# Patient Record
Sex: MALE | Race: WHITE | Employment: STUDENT | URBAN - METROPOLITAN AREA
[De-identification: names, ages, dates, MRNs, and addresses within clinical notes are randomized per-mention and may not be internally consistent; named-entity substitution may affect disease eponyms.]

---
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
## 2024-01-17 ENCOUNTER — TELEPHONE (OUTPATIENT)
Dept: ENDOCRINOLOGY | Facility: CLINIC | Age: 19
End: 2024-01-17

## 2024-01-17 DIAGNOSIS — Q98.0 XXY SYNDROME: Primary | ICD-10-CM

## 2024-01-17 NOTE — TELEPHONE ENCOUNTER
[facility] Call Center    Phone Message    May a detailed message be left on voicemail: yes     Reason for Call: Other: Pt calling to schedule follow up, now 18 yr old wanting to know if needing to transition out of peds requesting follow up from care team.     Action Taken: Other: end    Travel Screening: Not Applicable

## 2024-11-27 NOTE — CONFIDENTIAL NOTE
RECORDS RECEIVED FROM: internal    DATE RECEIVED: 12.17.24    NOTES (FOR ALL VISITS) STATUS DETAILS   OFFICE NOTES from referring provider internal    Denice Cuellar MD      OFFICE NOTES from other specialist In process     ED NOTES In process     OPERATIVE REPORT  (thyroid, pituitary, adrenal, parathyroid) In process     MEDICATION LIST internal     IMAGING      DEXASCAN In process     MRI (BRAIN) In process     XR (Chest) In process     CT (HEAD/NECK/CHEST/ABDOMEN) In process     NUCLEAR  In process     ULTRASOUND (HEAD/NECK) In process     LABS     DIABETES: HBGA1C, CREATININE, FASTING LIPIDS, MICROALBUMIN URINE, POTASSIUM, TSH, T4    THYROID: TSH, T4, CBC, THYRODLONULIN, TOTAL T3, FREE T4, CALCITONIN, CEA internal          Action 11.27.24 sv    Action Taken Called pt and lvm about gathering medical records

## 2024-12-17 ENCOUNTER — PRE VISIT (OUTPATIENT)
Dept: ENDOCRINOLOGY | Facility: CLINIC | Age: 19
End: 2024-12-17